# Patient Record
Sex: FEMALE
[De-identification: names, ages, dates, MRNs, and addresses within clinical notes are randomized per-mention and may not be internally consistent; named-entity substitution may affect disease eponyms.]

---

## 2018-07-31 ENCOUNTER — HOSPITAL ENCOUNTER (EMERGENCY)
Dept: HOSPITAL 97 - ER | Age: 33
LOS: 1 days | Discharge: HOME | End: 2018-08-01
Payer: SELF-PAY

## 2018-07-31 DIAGNOSIS — L08.9: Primary | ICD-10-CM

## 2018-07-31 DIAGNOSIS — F17.210: ICD-10-CM

## 2018-07-31 PROCEDURE — 99282 EMERGENCY DEPT VISIT SF MDM: CPT

## 2018-08-01 VITALS — SYSTOLIC BLOOD PRESSURE: 117 MMHG | DIASTOLIC BLOOD PRESSURE: 48 MMHG

## 2018-08-01 VITALS — OXYGEN SATURATION: 98 %

## 2018-08-01 VITALS — TEMPERATURE: 99.7 F

## 2018-08-01 NOTE — EDPHYS
Physician Documentation                                                                           

 [facility]                                                                

Name: Marlen Shelby                                                                              

Age: 32 yrs                                                                                       

Sex: Female                                                                                       

: 1985                                                                                   

MRN: [id number]                                                                                   

Arrival Date: 2018                                                                          

Time: 23:01                                                                                       

Account#: [id number]                                                                            

Bed 24                                                                                            

Private MD:                                                                                       

ED Physician Mariusz Yancey                                                                      

HPI:                                                                                              

                                                                                             

00:01 This 32 yrs old  Female presents to ER via Ambulatory with complaints of skin   jr8 

      infection .                                                                                 

00:01 the patient presents with a swollen area of the abdomen. Description: The affected area jr8 

      is small, well demarcated, draining, erythematous. Onset: The symptoms/episode              

      began/occurred gradually, 2 day(s) ago. Possible cause(s): unknown. Associated signs        

      and symptoms: The patient has no apparent associated signs or symptoms. Severity of         

      symptoms: At their worst the symptoms were mild, in the emergency department the            

      symptoms are unchanged. The patient has not experienced similar symptoms in the past.       

      The patient has not recently seen a physician. Patient stated that she noticed blood        

      blister like formation on lower abdomen the size of a quarter. Today it popped. Blood       

      and yellow exudative material came from wound. Stated that she has increased erythema       

      around the wound now as well .                                                              

                                                                                                  

OB/GYN:                                                                                           

                                                                                             

23:16 LMP 2018                                                                           fc  

                                                                                                  

Historical:                                                                                       

- Allergies:                                                                                      

23:16 No Known Allergies;                                                                     fc  

- Home Meds:                                                                                      

23:16 None [Active];                                                                          fc  

- PMHx:                                                                                           

23:16 Kidney stones;                                                                          fc  

- PSHx:                                                                                           

23:16 kidney stone surg;                                                                      fc  

                                                                                                  

- Immunization history:: Last tetanus immunization: up to date.                                   

- Social history:: Smoking status: Patient uses tobacco products, smokes one pack                 

  cigarettes per day.                                                                             

- Ebola Screening: : Patient negative for fever greater than or equal to 101.5 degrees            

  Fahrenheit, and additional compatible Ebola Virus Disease symptoms Patient denies               

  exposure to infectious person Patient denies travel to an Ebola-affected area in the            

  21 days before illness onset.                                                                   

                                                                                                  

                                                                                                  

ROS:                                                                                              

                                                                                             

00:01 Eyes: Negative for injury, pain, redness, and discharge, ENT: Negative for injury,      jr8 

      pain, and discharge, Neck: Negative for injury, pain, and swelling, Cardiovascular:         

      Negative for chest pain, palpitations, and edema, Respiratory: Negative for shortness       

      of breath, cough, wheezing, and pleuritic chest pain, Abdomen/GI: Negative for              

      abdominal pain, nausea, vomiting, diarrhea, and constipation, Back: Negative for injury     

      and pain, MS/Extremity: Negative for injury and deformity, Neuro: Negative for              

      headache, weakness, numbness, tingling, and seizure.                                        

      Skin: Positive for abscess, erythema, of the abdomen.                                       

                                                                                                  

Exam:                                                                                             

00:01 Eyes:  Pupils equal round and reactive to light, extra-ocular motions intact.  Lids and jr8 

      lashes normal.  Conjunctiva and sclera are non-icteric and not injected.  Cornea within     

      normal limits.  Periorbital areas with no swelling, redness, or edema. ENT:  Nares          

      patent. No nasal discharge, no septal abnormalities noted.  Tympanic membranes are          

      normal and external auditory canals are clear.  Oropharynx with no redness, swelling,       

      or masses, exudates, or evidence of obstruction, uvula midline.  Mucous membranes           

      moist. Neck:  Trachea midline, no thyromegaly or masses palpated, and no cervical           

      lymphadenopathy.  Supple, full range of motion without nuchal rigidity, or vertebral        

      point tenderness.  No Meningismus. Cardiovascular:  Regular rate and rhythm with a          

      normal S1 and S2.  No gallops, murmurs, or rubs.  Normal PMI, no JVD.  No pulse             

      deficits. Respiratory:  Lungs have equal breath sounds bilaterally, clear to                

      auscultation and percussion.  No rales, rhonchi or wheezes noted.  No increased work of     

      breathing, no retractions or nasal flaring. Abdomen/GI:  Soft, non-tender, with normal      

      bowel sounds.  No distension or tympany.  No guarding or rebound.  No evidence of           

      tenderness throughout. Back:  No spinal tenderness.  No costovertebral tenderness.          

      Full range of motion. MS/ Extremity:  Pulses equal, no cyanosis.  Neurovascular intact.     

       Full, normal range of motion. Neuro:  Awake and alert, GCS 15, oriented to person,         

      place, time, and situation.  Cranial nerves II-XII grossly intact.  Motor strength 5/5      

      in all extremities.  Sensory grossly intact.  Cerebellar exam normal.  Normal gait.         

00:01 Skin: Quarter sized area on left lower abdomen that had previous blister formation. Now     

      with serosanguinous discharge from wound. About 3 cm of erythema surrounds the blister.     

      Mild induration felt. No fluctuance noted .                                                 

                                                                                                  

Vital Signs:                                                                                      

                                                                                             

23:16  / 95; Pulse 77; Resp 18; Temp 99.7(O); Pulse Ox 100% on R/A; Weight 142.88 kg      

      (R); Height 5 ft. 8 in. (172.72 cm) (R); Pain 6/10;                                         

23:16 Body Mass Index 47.89 (142.88 kg, 172.72 cm)                                              

                                                                                                  

MDM:                                                                                              

23:31 Patient medically screened.                                                             jr8 

                                                                                             

00:01 Data reviewed: vital signs, nurses notes, and as a result, I will discharge patient.    jr8 

      Data interpreted: Pulse oximetry: on room air is 100 %. Interpretation: normal.             

      Counseling: I had a detailed discussion with the patient and/or guardian regarding: the     

      historical points, exam findings, and any diagnostic results supporting the                 

      discharge/admit diagnosis, the need for outpatient follow up, a family practitioner, to     

      return to the emergency department if symptoms worsen or persist or if there are any        

      questions or concerns that arise at home.                                                   

                                                                                                  

Administered Medications:                                                                         

No medications were administered                                                                  

                                                                                                  

                                                                                                  

Disposition:                                                                                      

07:08 Co-signature as Attending Physician, Mariusz Yancey MD I agree with the assessment and  wa  

      plan of care.                                                                               

                                                                                                  

Disposition:                                                                                      

18 00:06 Discharged to Home. Impression: Local infection of the skin and subcutaneous       

  tissue, unspecified.                                                                            

- Condition is Stable.                                                                            

- Discharge Instructions: Skin Abscess, Cellulitis, Adult.                                        

- Prescriptions for Bactrim - 160 mg Oral Tablet - take 1 tablet by ORAL route              

  every 12 hours for 10 days; 20 tablet.                                                          

- Medication Reconciliation Form, Thank You Letter, Antibiotic Education, Prescription            

  Opioid Use, Work release form form.                                                             

- Follow up: Private Physician; When: 5 - 6 days; Reason: Recheck today's complaints,             

  Continuance of care, Re-evaluation by your physician.                                           

- Problem is new.                                                                                 

- Symptoms have improved.                                                                         

                                                                                                  

                                                                                                  

                                                                                                  

Signatures:                                                                                       

Afia Hernandez RN                   RN                                                      

Krishna Norton PA PA   jr8                                                  

Mariusz Yancey MD MD   wa                                                   

Devon Reynoso RN                    RN   mg2                                                  

                                                                                                  

Corrections: (The following items were deleted from the chart)                                    

00:20 00:06 2018 00:06 Discharged to Home. Impression: Local infection of the skin and  mg2 

      subcutaneous tissue, unspecified. Condition is Stable. Forms are Medication                 

      Reconciliation Form, Thank You Letter, Antibiotic Education, Prescription Opioid Use.       

      Follow up: Private Physician; When: 5 - 6 days; Reason: Recheck today's complaints,         

      Continuance of care, Re-evaluation by your physician. Problem is new. Symptoms have         

      improved. jr8                                                                               

                                                                                                  

**************************************************************************************************

## 2018-08-01 NOTE — ER
Nurse's Notes                                                                                     

 [facility]                                                                

Name: Marlen Shelby                                                                              

Age: 32 yrs                                                                                       

Sex: Female                                                                                       

: 1985                                                                                   

MRN: [id number]                                                                                   

Arrival Date: 2018                                                                          

Time: 23:01                                                                                       

Account#: [id number]                                                                            

Bed 24                                                                                            

Private MD:                                                                                       

Diagnosis: Local infection of the skin and subcutaneous tissue, unspecified                       

                                                                                                  

Presentation:                                                                                     

                                                                                             

23:13 Presenting complaint: Patient states: that she noted a couple of days ago that she what fc  

      she thinks is an insect bit to left lower abd. Today it opened and had yellow drainage.     

      Area around the wound is red, hot and tender. Transition of care: patient was not           

      received from another setting of care. Onset of symptoms was 2018. Risk            

      Assessment: Do you want to hurt yourself or someone else? Patient reports no desire to      

      harm self or others. Initial Sepsis Screen: Does the patient meet any 2 criteria? No.       

      Patient's initial sepsis screen is negative. Does the patient have a suspected source       

      of infection? No. Patient's initial sepsis screen is negative. Care prior to arrival:       

      None.                                                                                       

23:13 Method Of Arrival: Ambulatory                                                             

23:13 Acuity: MATY 4                                                                           fc  

                                                                                                  

OB/GYN:                                                                                           

23:16 LMP 2018                                                                           fc  

                                                                                                  

Historical:                                                                                       

- Allergies:                                                                                      

23:16 No Known Allergies;                                                                     fc  

- Home Meds:                                                                                      

23:16 None [Active];                                                                          fc  

- PMHx:                                                                                           

23:16 Kidney stones;                                                                          fc  

- PSHx:                                                                                           

23:16 kidney stone surg;                                                                      fc  

                                                                                                  

- Immunization history:: Last tetanus immunization: up to date.                                   

- Social history:: Smoking status: Patient uses tobacco products, smokes one pack                 

  cigarettes per day.                                                                             

- Ebola Screening: : Patient negative for fever greater than or equal to 101.5 degrees            

  Fahrenheit, and additional compatible Ebola Virus Disease symptoms Patient denies               

  exposure to infectious person Patient denies travel to an Ebola-affected area in the            

  21 days before illness onset.                                                                   

                                                                                                  

                                                                                                  

Screenin:19 Abuse screen: Denies threats or abuse. Denies injuries from another. Nutritional        mg2 

      screening: No deficits noted. Tuberculosis screening: No symptoms or risk factors           

      identified. Fall Risk None identified.                                                      

                                                                                                  

Assessment:                                                                                       

23:19 General: Appears in no apparent distress. comfortable, Behavior is calm, cooperative.   mg2 

      Pain: Complains of pain in left lower abdomen Pain does not radiate. Pain currently is      

      6 out of 10 on a pain scale. Quality of pain is described as aching, Pain began             

      gradually, 2-3 days ago. Is intermittent. Neuro: Level of Consciousness is awake,           

      alert, obeys commands, Oriented to person, place, time, situation. Cardiovascular:          

      Capillary refill < 3 seconds Patient's skin is warm and dry. Respiratory: Airway is         

      patent Respiratory effort is even, unlabored, Respiratory pattern is regular,               

      symmetrical. GI: No signs and/or symptoms were reported involving the gastrointestinal      

      system. : No signs and/or symptoms were reported regarding the genitourinary system.      

      EENT: No signs and/or symptoms were reported regarding the EENT system. Derm: Skin has      

      lesions on in the left lower abdomen Skin is pink, warm \T\ dry. normal, Abscess located    

      on left lower abdomen is quarter sized. Musculoskeletal: Circulation, motion, and           

      sensation intact.                                                                           

                                                                                                  

Vital Signs:                                                                                      

23:16  / 95; Pulse 77; Resp 18; Temp 99.7(O); Pulse Ox 100% on R/A; Weight 142.88 kg    fc  

      (R); Height 5 ft. 8 in. (172.72 cm) (R); Pain 6/10;                                         

23:16 Body Mass Index 47.89 (142.88 kg, 172.72 cm)                                              

                                                                                                  

ED Course:                                                                                        

23:01 Patient arrived in ED.                                                                  es  

23:15 Triage completed.                                                                         

23:16 Arm band placed on Patient placed in an exam room, on a stretcher.                        

23:19 Devon Reynoso, PARTH is Primary Nurse.                                                  mg2 

23:24 Patient has correct armband on for positive identification. Door closed. Warm blanket   mg2 

      given.                                                                                      

23:31 Krishna Norton PA is PHCP.                                                               [id number] 

23:31 Mariusz Yancey MD is Attending Physician.                                             [id number] 

08                                                                                             

00:16 No provider procedures requiring assistance completed. Patient did not have IV access   mg2 

      during this emergency room visit.                                                           

                                                                                                  

Administered Medications:                                                                         

No medications were administered                                                                  

                                                                                                  

                                                                                                  

Outcome:                                                                                          

00:06 Discharge ordered by MD.                                                                [id number] 

00:16 Discharged to home ambulatory, with family.                                             mg2 

00:16 Condition: good                                                                             

00:16 Discharge instructions given to patient, family, Instructed on discharge instructions,      

      follow up and referral plans. medication usage, Demonstrated understanding of               

      instructions, follow-up care, medications, Prescriptions given X 1.                         

00:20 Patient left the ED.                                                                    mg2 

                                                                                                  

Signatures:                                                                                       

Pilar Moreland Felicia RN                   RN   [facility] Krish[facility]                        PA   [id number]                                                  

Devon Reynoso, RN                    RN   mg2                                                  

                                                                                                  

**************************************************************************************************

## 2018-09-10 ENCOUNTER — HOSPITAL ENCOUNTER (INPATIENT)
Dept: HOSPITAL 97 - ER | Age: 33
LOS: 2 days | Discharge: HOME | DRG: 690 | End: 2018-09-12
Attending: FAMILY MEDICINE | Admitting: HOSPITALIST
Payer: SELF-PAY

## 2018-09-10 VITALS — BODY MASS INDEX: 47.4 KG/M2

## 2018-09-10 DIAGNOSIS — B96.20: ICD-10-CM

## 2018-09-10 DIAGNOSIS — E66.01: ICD-10-CM

## 2018-09-10 DIAGNOSIS — F17.200: ICD-10-CM

## 2018-09-10 DIAGNOSIS — N13.6: Primary | ICD-10-CM

## 2018-09-10 LAB
ALBUMIN SERPL BCP-MCNC: 3.7 G/DL (ref 3.4–5)
ALP SERPL-CCNC: 87 U/L (ref 45–117)
ALT SERPL W P-5'-P-CCNC: 41 U/L (ref 12–78)
AMYLASE SERPL-CCNC: 28 U/L (ref 25–115)
AST SERPL W P-5'-P-CCNC: 20 U/L (ref 15–37)
BUN BLD-MCNC: 10 MG/DL (ref 7–18)
GLUCOSE SERPLBLD-MCNC: 113 MG/DL (ref 74–106)
HCT VFR BLD CALC: 41 % (ref 36–45)
LIPASE SERPL-CCNC: 75 U/L (ref 73–393)
LYMPHOCYTES # SPEC AUTO: 1.2 K/UL (ref 0.7–4.9)
MCH RBC QN AUTO: 30.4 PG (ref 27–35)
MCV RBC: 90.3 FL (ref 80–100)
PMV BLD: 8.7 FL (ref 7.6–11.3)
POTASSIUM SERPL-SCNC: 4 MMOL/L (ref 3.5–5.1)
RBC # BLD: 4.54 M/UL (ref 3.86–4.86)
UA COMPLETE W REFLEX CULTURE PNL UR: (no result)

## 2018-09-10 PROCEDURE — 80076 HEPATIC FUNCTION PANEL: CPT

## 2018-09-10 PROCEDURE — 83690 ASSAY OF LIPASE: CPT

## 2018-09-10 PROCEDURE — 87077 CULTURE AEROBIC IDENTIFY: CPT

## 2018-09-10 PROCEDURE — 80053 COMPREHEN METABOLIC PANEL: CPT

## 2018-09-10 PROCEDURE — 74450 X-RAY URETHRA/BLADDER: CPT

## 2018-09-10 PROCEDURE — 85025 COMPLETE CBC W/AUTO DIFF WBC: CPT

## 2018-09-10 PROCEDURE — 74176 CT ABD & PELVIS W/O CONTRAST: CPT

## 2018-09-10 PROCEDURE — 96365 THER/PROPH/DIAG IV INF INIT: CPT

## 2018-09-10 PROCEDURE — 36415 COLL VENOUS BLD VENIPUNCTURE: CPT

## 2018-09-10 PROCEDURE — 87186 SC STD MICRODIL/AGAR DIL: CPT

## 2018-09-10 PROCEDURE — 99285 EMERGENCY DEPT VISIT HI MDM: CPT

## 2018-09-10 PROCEDURE — 81025 URINE PREGNANCY TEST: CPT

## 2018-09-10 PROCEDURE — 81003 URINALYSIS AUTO W/O SCOPE: CPT

## 2018-09-10 PROCEDURE — 80048 BASIC METABOLIC PNL TOTAL CA: CPT

## 2018-09-10 PROCEDURE — 96361 HYDRATE IV INFUSION ADD-ON: CPT

## 2018-09-10 PROCEDURE — 96375 TX/PRO/DX INJ NEW DRUG ADDON: CPT

## 2018-09-10 PROCEDURE — 51610 INJECTION FOR BLADDER X-RAY: CPT

## 2018-09-10 PROCEDURE — 87086 URINE CULTURE/COLONY COUNT: CPT

## 2018-09-10 PROCEDURE — 87088 URINE BACTERIA CULTURE: CPT

## 2018-09-10 PROCEDURE — 82150 ASSAY OF AMYLASE: CPT

## 2018-09-10 PROCEDURE — 81015 MICROSCOPIC EXAM OF URINE: CPT

## 2018-09-10 RX ADMIN — Medication SCH: at 21:00

## 2018-09-10 RX ADMIN — SODIUM CHLORIDE SCH MLS: 0.9 INJECTION, SOLUTION INTRAVENOUS at 23:11

## 2018-09-10 RX ADMIN — Medication SCH ML: at 23:12

## 2018-09-10 NOTE — EDPHYS
Physician Documentation                                                                           

 [facility]                                                                

Name: Marlen Shelby                                                                              

Age: 32 yrs                                                                                       

Sex: Female                                                                                       

: 1985                                                                                   

MRN: [id number]                                                                                   

Arrival Date: 09/10/2018                                                                          

Time: 19:08                                                                                       

Account#: [id number]                                                                            

Bed 15                                                                                            

Private MD:                                                                                       

ED Physician Marjan Sargent                                                                    

HPI:                                                                                              

09/10                                                                                             

19:42 This 32 yrs old  Female presents to ER via Wheelchair with complaints of Flank  ma2 

      Pain, Back Pain.                                                                            

19:42 The patient complains of pain in the left low back. The pain does not radiate. Onset:   ma2 

      The symptoms/episode began/occurred gradually, 1 day(s) ago. Associated signs and           

      symptoms: Pertinent positives: dysuria, fever, nausea, vomiting, Pertinent negatives:       

      diarrhea, dizziness. Severity of pain: in the emergency department the pain is actually     

      worse. The patient has experienced a previous episode.                                      

                                                                                                  

OB/GYN:                                                                                           

19:35 LMP 2018                                                                           ea  

                                                                                                  

Historical:                                                                                       

- Allergies:                                                                                      

19:33 No Known Allergies;                                                                     ea  

- PMHx:                                                                                           

19:33 Kidney stones;                                                                          ea  

- PSHx:                                                                                           

19:33 kidney stone surg;                                                                      ea  

                                                                                                  

- Immunization history:: Adult Immunizations up to date.                                          

- Social history:: Smoking status: Patient/guardian denies using tobacco,                         

  Patient/guardian denies using alcohol, street drugs, The patient lives with family.             

- Ebola Screening: : No symptoms or risks identified at this time.                                

- Family history:: not pertinent.                                                                 

                                                                                                  

                                                                                                  

ROS:                                                                                              

19:42 Constitutional: Negative for fever, chills, and weight loss, ENT: Negative for injury,  ma2 

      pain, and discharge, Cardiovascular: Negative for chest pain, palpitations, and edema.      

19:42 : Positive for urinary symptoms, flank pain, Negative for burning with urination,         

      difficulty urinating, missed period.                                                        

19:42 All other systems are negative.                                                             

                                                                                                  

Exam:                                                                                             

19:42 Neck:  Trachea midline, no thyromegaly or masses palpated, and no cervical              ma2 

      lymphadenopathy.  Supple, full range of motion without nuchal rigidity, or vertebral        

      point tenderness.  No Meningismus. Chest/axilla:  Normal chest wall appearance and          

      motion.  Nontender with no deformity.  No lesions are appreciated. Cardiovascular:          

      Regular rate and rhythm with a normal S1 and S2.  No gallops, murmurs, or rubs.  Normal     

      PMI, no JVD.  No pulse deficits. Respiratory:  Lungs have equal breath sounds               

      bilaterally, clear to auscultation and percussion.  No rales, rhonchi or wheezes noted.     

       No increased work of breathing, no retractions or nasal flaring. MS/ Extremity:            

      Pulses equal, no cyanosis.  Neurovascular intact.  Full, normal range of motion. Neuro:     

       Awake and alert, GCS 15, oriented to person, place, time, and situation.  Cranial          

      nerves II-XII grossly intact.  Motor strength 5/5 in all extremities.  Sensory grossly      

      intact.  Cerebellar exam normal.  Normal gait.                                              

19:42 Abdomen/GI:  Soft, non-tender, with normal bowel sounds.  No distension or tympany.  No     

      guarding or rebound.  No evidence of tenderness throughout.                                 

19:42 Constitutional: The patient appears in no acute distress.                                   

19:42 : CVA tenderness, on the left.                                                            

                                                                                                  

Vital Signs:                                                                                      

19:35  / 92; Pulse 85; Resp 19; Temp 98.5(O); Pulse Ox 100% on R/A; Weight 141.52 kg;   ea  

      Height 5 ft. 8 in. (172.72 cm); Pain 8/10;                                                  

20:01  / 82; Pulse 84; Resp 18; Pulse Ox 100% ; Pain 5/10;                              ea  

21:00  / 82; Pulse 75; Resp 18; Pulse Ox 100% ;                                         ea  

22:15  / 89; Pulse 75; Resp 18; Temp 98; Pulse Ox 100% on R/A; Pain 6/10;               ea  

19:35 Body Mass Index 47.44 (141.52 kg, 172.72 cm)                                            ea  

                                                                                                  

MDM:                                                                                              

19:24 Patient medically screened.                                                             ma2 

19:42 Differential diagnosis: nephrolithiasis, pyelonephritis, UTI, pancreatitis.             ma2 

20:30 Data reviewed: vital signs, nurses notes, lab test result(s), radiologic studies.       ma2 

      Counseling: I had a detailed discussion with the patient and/or guardian regarding: the     

      historical points, exam findings, and any diagnostic results supporting the                 

      discharge/admit diagnosis, the presence of at least one elevated blood pressure reading     

      (>120/80) during this emergency department visit, the need for further work-up and          

      treatment in the hospital. Response to treatment: the patient's symptoms have mildly        

      improved after treatment. ED course: case discussed with Dr. Bhatia urologist, he            

      recommend admission npo and IV abx, discussed and accepted by Dr. cadena .                    

                                                                                                  

09/10                                                                                             

19:32 Order name: Urine Dipstick--Ancillary (enter results)                                   [facility] 

09/10                                                                                             

19:32 Order name: Urine Culture                                                               [facility] 

09/10                                                                                             

19:32 Order name: Urine Microscopic Only; Complete Time: 20:24                                [facility] 

09/10                                                                                             

19:32 Order name: Urine Pregnancy--Ancillary (enter results)                                  [facility] 

09/10                                                                                             

19:40 Order name: Amylase, Serum; Complete Time: 20:24                                        [facility] 

09/10                                                                                             

19:40 Order name: Basic Metabolic Panel; Complete Time: 20:24                                 [facility] 

09/10                                                                                             

20:30 Interpretation: Within normal limits.                                                   [facility] 

09/10                                                                                             

19:40 Order name: CBC with Diff; Complete Time: 20:24                                         [facility] 

09/10                                                                                             

19:40 Order name: Creatinine for Radiology; Complete Time: 20:24                              [facility] 

09/10                                                                                             

19:40 Order name: Hepatic Function; Complete Time: 20:24                                      [facility] 

09/10                                                                                             

19:40 Order name: Lipase; Complete Time: 20:24                                                [facility] 

09/10                                                                                             

20:45 Order name: CBC with Automated Diff                                                     [facility]

09/10                                                                                             

20:45 Order name: CBC with Automated Diff                                                     [facility]

09/10                                                                                             

20:45 Order name: Comprehensive Metabolic Panel                                               [facility]

09/10                                                                                             

20:45 Order name: Comprehensive Metabolic Panel                                               [facility]

09/10                                                                                             

19:40 Order name: Urine Pregnancy Test (obtain specimen); Complete Time: 19:42                [facility] 

09/10                                                                                             

19:40 Order name: IV Saline Lock; Complete Time: 19:42                                        [facility] 

09/10                                                                                             

19:40 Order name: Labs collected and sent; Complete Time: 19:42                               [facility] 

09/10                                                                                             

19:40 Order name: CT Abd/Pelvis - Without Cont; Complete Time: 20:24                          [facility] 

09/10                                                                                             

20:40 Order name: CONS Pharmacy Consult                                                       [facility]

09/10                                                                                             

20:40 Order name: NPO                                                                         [facility]

09/10                                                                                             

20:45 Order name: CONS Pharmacy Consult                                                       [facility]

09/10                                                                                             

20:45 Order name: CONS Physician Consult                                                      [facility]

09/10                                                                                             

19:40 Order name: Urine Dipstick-Ancillary (obtain specimen); Complete Time: 19:42            ma2 

                                                                                                  

Administered Medications:                                                                         

20:03 Drug: NS 0.9% 1000 ml Route: IV; Rate: 1 bolus; Site: left antecubital;                 ea  

21:30 Follow up: Response: No adverse reaction; IV Status: Completed infusion; IV Intake:     ea  

      1000ml                                                                                      

20:03 Drug: morphine 4 mg Route: IVP; Site: left antecubital;                                 ea  

20:24 Follow up: Response: No adverse reaction; Pain is decreased                             ea  

20:04 Drug: Zofran 4 mg Route: IVP; Site: left antecubital;                                   ea  

20:25 Follow up: Response: No adverse reaction; Marked relief of symptoms                     ea  

20:04 Drug: Rocephin 1 grams Route: IV; Rate: calculated rate; Site: left antecubital;        ea  

20:25 Follow up: Response: No adverse reaction; IV Status: Completed infusion                 ea  

20:19 Drug: NS 0.9% 1000 ml Route: IV; Rate: 1 bolus; Site: left antecubital;                 ea  

22:00 Follow up: Response: No adverse reaction; IV Status: Completed infusion; IV Intake:     ea  

      1000ml                                                                                      

21:00 Drug: morphine 4 mg Route: IVP; Site: left antecubital;                                 ea  

21:30 Follow up: Response: No adverse reaction; Marked relief of symptoms; Pain is decreased  ea  

21:17 Drug: Zofran 4 mg Route: IVP; Site: left antecubital;                                   ea  

21:30 Follow up: Response: No adverse reaction; Marked relief of symptoms                     ea  

22:07 Drug: morphine 4 mg Route: IVP; Site: left antecubital;                                 ea  

22:20 Follow up: Response: No adverse reaction; Pain is decreased                             ea  

                                                                                                  

                                                                                                  

Disposition:                                                                                      

09/10/18 20:34 Hospitalization ordered by Marjan Cadena for Inpatient Admission. Preliminary     

  diagnosis are Calculus of lower urinary tract, Urinary tract infection, site                    

  not specified.                                                                                  

- Bed requested for Telemetry/MedSurg (Inpatient).                                                

- Status is Inpatient Admission.                                                              ea  

- Condition is Fair.                                                                              

- Problem is new.                                                                                 

- Symptoms are unchanged.                                                                         

UTI on Admission? Yes                                                                             

                                                                                                  

                                                                                                  

                                                                                                  

Signatures:                                                                                       

Dispatcher [employer]                           EDMS                                                 

Carmen Astudillo                               rg2                                                  

Maia Boyd RN RN ea Alzahri, Mohammad, MD MD ma2                                                  

                                                                                                  

Corrections: (The following items were deleted from the chart)                                    

20:30 20:30 Abnormal. ma2                                                                     ma2 

21:59 20:34 Hospitalization Ordered by Marjan Cadena MD for Inpatient Admission. Preliminary  rg2 

      diagnosis is Calculus of lower urinary tract; Urinary tract infection, site not             

      specified. Bed requested for Telemetry/MedSurg (Inpatient). Status is Inpatient             

      Admission. Condition is Fair. Problem is new. Symptoms are unchanged. UTI on Admission?     

      Yes. ma2                                                                                    

22:30 21:59 09/10/2018 20:34 Hospitalization Ordered by Marjan Cadena MD for Inpatient        ea  

      Admission. Preliminary diagnosis is Calculus of lower urinary tract; Urinary tract          

      infection, site not specified. Bed requested for Telemetry/MedSurg (Inpatient). Status      

      is Inpatient Admission. Condition is Fair. Problem is new. Symptoms are unchanged. UTI      

      on Admission? Yes. rg2                                                                      

                                                                                                  

**************************************************************************************************

## 2018-09-10 NOTE — ER
Nurse's Notes                                                                                     

 [facility]                                                                

Name: Marlen Shelby                                                                              

Age: 32 yrs                                                                                       

Sex: Female                                                                                       

: 1985                                                                                   

MRN: [id number]                                                                                   

Arrival Date: 09/10/2018                                                                          

Time: 19:08                                                                                       

Account#: [id number]                                                                            

Bed 15                                                                                            

Private MD:                                                                                       

Diagnosis: Calculus of lower urinary tract;Urinary tract infection, site not specified            

                                                                                                  

Presentation:                                                                                     

09/10                                                                                             

19:31 Presenting complaint: Patient states: Patient states she started to have left flank     ea  

      pain that started yesterday and started to get worse this evening. Pt reports history       

      of kidney stone about 18 months ago, and complains of urinary frequency. Transition of      

      care: patient was not received from another setting of care. Onset of symptoms was          

      September 10, 2018. Risk Assessment: Do you want to hurt yourself or someone else?          

      Patient reports no desire to harm self or others. Initial Sepsis Screen: Does the           

      patient meet any 2 criteria? No. Patient's initial sepsis screen is negative. Does the      

      patient have a suspected source of infection? Yes: Dysuria/Frequency/Urgency/UTI. Care      

      prior to arrival: None.                                                                     

19:31 Method Of Arrival: Wheelchair                                                           ea  

19:31 Acuity: MATY 3                                                                           ea  

                                                                                                  

Triage Assessment:                                                                                

19:34 General: Appears uncomfortable, Behavior is calm, cooperative. Pain: Complains of pain  ea  

      in left low back, left flank Pain currently is 8 out of 10 on a pain scale. Quality of      

      pain is described as aching, Pain began 1 day ago. Is intermittent. EENT: No signs          

      and/or symptoms were reported regarding the EENT system. Neuro: Level of Consciousness      

      is awake, alert, obeys commands, Oriented to person, place, time, situation.                

      Cardiovascular: Patient's skin is warm and dry. Respiratory: Airway is patent               

      Respiratory effort is even, unlabored, Respiratory pattern is regular, symmetrical. GI:     

      Abdomen is non-distended, Bowel sounds present X 4 quads. : Reports urinary               

      frequency. Derm: Skin is pink, warm \T\ dry. Musculoskeletal: Circulation, motion, and      

      sensation intact.                                                                           

                                                                                                  

OB/GYN:                                                                                           

19:35 LMP 2018                                                                           ea  

                                                                                                  

Historical:                                                                                       

- Allergies:                                                                                      

19:33 No Known Allergies;                                                                     ea  

- PMHx:                                                                                           

19:33 Kidney stones;                                                                          ea  

- PSHx:                                                                                           

19:33 kidney stone surg;                                                                      ea  

                                                                                                  

- Immunization history:: Adult Immunizations up to date.                                          

- Social history:: Smoking status: Patient/guardian denies using tobacco,                         

  Patient/guardian denies using alcohol, street drugs, The patient lives with family.             

- Ebola Screening: : No symptoms or risks identified at this time.                                

- Family history:: not pertinent.                                                                 

                                                                                                  

                                                                                                  

Screenin:36 Abuse screen: Denies threats or abuse. Nutritional screening: No deficits noted.        ea  

      Tuberculosis screening: No symptoms or risk factors identified. Fall Risk None              

      identified.                                                                                 

                                                                                                  

Assessment:                                                                                       

19:31 Reassessment: see triage assessment. Neuro: Level of Consciousness is awake, alert,     ea  

      obeys commands, Oriented to person, place, time, situation.                                 

20:24 Reassessment: Patient and/or family updated on plan of care and expected duration. Pain ea  

      level reassessed. Patient is alert, oriented x 3, equal unlabored respirations, skin        

      warm/dry/pink. Patient states feeling better. Patient states symptoms have improved.        

21:00 Reassessment: Pt complaining of pain, provider notified, med order obtained, medication ea  

      administered, pt tolerated well.                                                            

22:16 Reassessment: Report given to Liset ALBA on fourth floor.                                ea  

                                                                                                  

Vital Signs:                                                                                      

19:35  / 92; Pulse 85; Resp 19; Temp 98.5(O); Pulse Ox 100% on R/A; Weight 141.52 kg;   ea  

      Height 5 ft. 8 in. (172.72 cm); Pain 8/10;                                                  

20:01  / 82; Pulse 84; Resp 18; Pulse Ox 100% ; Pain 5/10;                              ea  

21:00  / 82; Pulse 75; Resp 18; Pulse Ox 100% ;                                         ea  

22:15  / 89; Pulse 75; Resp 18; Temp 98; Pulse Ox 100% on R/A; Pain 6/10;               ea  

19:35 Body Mass Index 47.44 (141.52 kg, 172.72 cm)                                            ea  

                                                                                                  

ED Course:                                                                                        

19:08 Patient arrived in ED.                                                                  rg4 

19:23 Marjan Sargent MD is Attending Physician.                                           ma2 

19:30 Arm band placed on right wrist. Patient placed in an exam room, on a stretcher, on      ea  

      pulse oximetry.                                                                             

19:30 Urine collected: clean catch specimen, cloudy.                                          cc  

19:31 Maia Boyd, PARTH is Primary Nurse.                                                    ea  

19:33 Triage completed.                                                                       ea  

19:36 Patient has correct armband on for positive identification. Bed in low position. Call   ea  

      light in reach. Side rails up X 1.                                                          

19:40 Initial lab(s) drawn, by me, sent to lab. Inserted saline lock: 20 gauge in left        cc  

      antecubital area, using aseptic technique. Blood collected.                                 

19:44 Patient moved to CT.                                                                    j6 

19:50 CT completed. Patient tolerated procedure well. Patient moved back from CT.             6 

19:51 CT Abd/Pelvis - Without Cont In Process Unspecified.                                    EDMS

20:32 Marjan Woody MD is Hospitalizing Provider.                                           ma 

22:16 No provider procedures requiring assistance completed. Patient admitted, IV remains in  ea  

      place.                                                                                      

                                                                                                  

Administered Medications:                                                                         

20:03 Drug: NS 0.9% 1000 ml Route: IV; Rate: 1 bolus; Site: left antecubital;                 ea  

21:30 Follow up: Response: No adverse reaction; IV Status: Completed infusion; IV Intake:     ea  

      1000ml                                                                                      

20:03 Drug: morphine 4 mg Route: IVP; Site: left antecubital;                                 ea  

20:24 Follow up: Response: No adverse reaction; Pain is decreased                             ea  

20:04 Drug: Zofran 4 mg Route: IVP; Site: left antecubital;                                   ea  

20:25 Follow up: Response: No adverse reaction; Marked relief of symptoms                     ea  

20:04 Drug: Rocephin 1 grams Route: IV; Rate: calculated rate; Site: left antecubital;        ea  

20:25 Follow up: Response: No adverse reaction; IV Status: Completed infusion                 ea  

20:19 Drug: NS 0.9% 1000 ml Route: IV; Rate: 1 bolus; Site: left antecubital;                 ea  

22:00 Follow up: Response: No adverse reaction; IV Status: Completed infusion; IV Intake:     ea  

      1000ml                                                                                      

21:00 Drug: morphine 4 mg Route: IVP; Site: left antecubital;                                 ea  

21:30 Follow up: Response: No adverse reaction; Marked relief of symptoms; Pain is decreased  ea  

21:17 Drug: Zofran 4 mg Route: IVP; Site: left antecubital;                                   ea  

21:30 Follow up: Response: No adverse reaction; Marked relief of symptoms                     ea  

22:07 Drug: morphine 4 mg Route: IVP; Site: left antecubital;                                 ea  

22:20 Follow up: Response: No adverse reaction; Pain is decreased                             ea  

                                                                                                  

                                                                                                  

Intake:                                                                                           

21:30 IV: 1000ml; Total: 1000ml.                                                              ea  

22:00 IV: 1000ml; Total: 2000ml.                                                              ea  

                                                                                                  

Outcome:                                                                                          

20:34 Decision to Hospitalize by Provider.                                                    pankaj 

22:17 Admitted to Med/surg accompanied by tech, room 421, with chart, Report called to        mode Hutchins RN                                                                                   

22:26 Condition: stable                                                                       ea  

22:30 Patient left the ED.                                                                    mode  

                                                                                                  

Signatures:                                                                                       

Dispatcher [employer]                           EDMS                                                 

Erin Mccoy Rubi rg4                                                  

Maia Boyd RN                      RN   Marjan Randle MD MD ma2                                                  

Margret Whiteg6                                                  

                                                                                                  

**************************************************************************************************

## 2018-09-10 NOTE — RAD REPORT
EXAM DESCRIPTION:  CT - Abdomen   Pelvis Wo Contrast - 9/10/2018 7:51 pm

 

CLINICAL HISTORY:  Abdominal pain.

renal colic

 

COMPARISON:  Abdomen   Pelvis W Contrast dated 3/2/2017

 

TECHNIQUE:  CT imaging of the abdomen and pelvis was performed without contrast. Solid organ, bowel a
nd vascular assessment is limited due to lack of IV and oral contrast.

 

All CT scans are performed using dose optimization technique as appropriate and may include automated
 exposure control or mA/KV adjustment according to patient size.

 

FINDINGS:  Small pulmonary nodule in the right lower lobe measuring 8 mm is noted, unchanged.

 

The liver, spleen, pancreas, adrenal glands and right kidney are within normal limits for a limited n
on-contrast examination.Mild left hydronephrosis and hydroureter is present caused by a suspected 1-2
 mm calculus in the distal left ureter.

 

No bowel obstruction, free air, free fluid or abscess.  The appendix is normal.

 

The osseous structures are within normal limits.

 

IMPRESSION:  Suspected 1-2 mm calculus in the distal left ureter resulting in mild left hydronephrosi
s.

 

A limited non-contrast examination was performed as detailed.

## 2018-09-11 LAB
ALBUMIN SERPL BCP-MCNC: 3.1 G/DL (ref 3.4–5)
ALP SERPL-CCNC: 71 U/L (ref 45–117)
ALT SERPL W P-5'-P-CCNC: 31 U/L (ref 12–78)
AST SERPL W P-5'-P-CCNC: 15 U/L (ref 15–37)
BUN BLD-MCNC: 8 MG/DL (ref 7–18)
GLUCOSE SERPLBLD-MCNC: 105 MG/DL (ref 74–106)
HCT VFR BLD CALC: 37.5 % (ref 36–45)
LYMPHOCYTES # SPEC AUTO: 2.5 K/UL (ref 0.7–4.9)
MCH RBC QN AUTO: 30.5 PG (ref 27–35)
MCV RBC: 90.8 FL (ref 80–100)
PMV BLD: 8.9 FL (ref 7.6–11.3)
POTASSIUM SERPL-SCNC: 4.2 MMOL/L (ref 3.5–5.1)
RBC # BLD: 4.13 M/UL (ref 3.86–4.86)

## 2018-09-11 PROCEDURE — BT1FZZZ FLUOROSCOPY OF LEFT KIDNEY, URETER AND BLADDER: ICD-10-PCS

## 2018-09-11 PROCEDURE — 0T778DZ DILATION OF LEFT URETER WITH INTRALUMINAL DEVICE, VIA NATURAL OR ARTIFICIAL OPENING ENDOSCOPIC: ICD-10-PCS

## 2018-09-11 RX ADMIN — MORPHINE SULFATE PRN MG: 4 INJECTION, SOLUTION INTRAMUSCULAR; INTRAVENOUS at 08:30

## 2018-09-11 RX ADMIN — MORPHINE SULFATE PRN MG: 4 INJECTION, SOLUTION INTRAMUSCULAR; INTRAVENOUS at 05:16

## 2018-09-11 RX ADMIN — Medication SCH: at 20:35

## 2018-09-11 RX ADMIN — Medication SCH: at 08:21

## 2018-09-11 RX ADMIN — SODIUM CHLORIDE SCH MLS: 0.9 INJECTION, SOLUTION INTRAVENOUS at 20:35

## 2018-09-11 RX ADMIN — METOPROLOL TARTRATE SCH MG: 50 TABLET, FILM COATED ORAL at 17:59

## 2018-09-11 RX ADMIN — MEPERIDINE HYDROCHLORIDE ONE MG: 50 INJECTION, SOLUTION INTRAMUSCULAR; INTRAVENOUS; SUBCUTANEOUS at 14:20

## 2018-09-11 RX ADMIN — MEPERIDINE HYDROCHLORIDE ONE MG: 50 INJECTION, SOLUTION INTRAMUSCULAR; INTRAVENOUS; SUBCUTANEOUS at 14:26

## 2018-09-11 RX ADMIN — CEFTRIAXONE SCH MLS: 1 INJECTION, SOLUTION INTRAVENOUS at 08:20

## 2018-09-11 RX ADMIN — SODIUM CHLORIDE SCH MLS: 0.9 INJECTION, SOLUTION INTRAVENOUS at 18:00

## 2018-09-11 RX ADMIN — SODIUM CHLORIDE SCH MLS: 0.9 INJECTION, SOLUTION INTRAVENOUS at 08:20

## 2018-09-11 RX ADMIN — MORPHINE SULFATE PRN MG: 4 INJECTION, SOLUTION INTRAMUSCULAR; INTRAVENOUS at 12:02

## 2018-09-11 RX ADMIN — MORPHINE SULFATE PRN MG: 4 INJECTION, SOLUTION INTRAMUSCULAR; INTRAVENOUS at 17:01

## 2018-09-11 RX ADMIN — METOPROLOL TARTRATE SCH MG: 50 TABLET, FILM COATED ORAL at 05:16

## 2018-09-11 RX ADMIN — MORPHINE SULFATE PRN MG: 4 INJECTION, SOLUTION INTRAMUSCULAR; INTRAVENOUS at 23:22

## 2018-09-11 NOTE — PN
Date of Progress Note:  09/11/2018



Subjective:  The patient is seen and examined.  Chart reviewed and case discussed with RN.  The patie
nt still complains of left flank pain.  It is some nausea.  No vomiting.



Review of Systems:

Negative except as above.



Medications:  List reviewed.



Physical Examination:

Vital Signs:  Temperature 97.4, heart rate 69, blood pressure 139/76, respirations 18, O2 100% on elizabeth
m air. 

General:  Awake, alert, oriented x3.  Some distress.  Morbidly obese female, ill-appearing. 

CV:  S1 and S2.  No murmurs.  Regular rate and rhythm.  Peripheral pulses present. 

Respiratory:  Moving air well bilaterally.  No wheezing or stridor.  No use of accessory muscles. 

Gastrointestinal:  Abdomen is soft, nontender, nondistended.  Positive bowel sounds.  The patient has
 left flank pain. 

Extremities:  No clubbing, cyanosis, or edema. 

Neurologic:  Nonfocal.



Laboratory Data:  Sodium 143, potassium 4.2, chloride 110, CO2 28, BUN 8, creatinine 0.8, glucose 105
, calcium 9.3.  Albumin 3.1.  WBC 11.2, H and H 12.6, 37.5, platelets 294, neutrophils 67%.  Urine cu
ltures growing 100,000 colony-forming units of 3+ gram-negative rods.



Assessment And Plan:  A 32-year-old female with:

1.Nephrolithiasis with left-sided hydronephrosis.  We will continue with IV antibiotics and IV fluid
s.  Dr. Bhatia with Neurology has been consulted.

2.Urinary tract infection with possible infected kidney stone.  Continue with IV antibiotics.  Urine
 culture growing out gram-negative rods.  ID and sensitivity pending.

3.Morbid obesity with BMI 47.4.  The patient will likely need 

cystoscopy and possible stent placement.

4.Continue current treatment.





SA/MODL

DD:  09/11/2018 13:31:00Voice ID:  [id number]

DT:  09/11/2018 17:45:33Report ID:  [id number]

## 2018-09-11 NOTE — CON
History Of Present Illness:  A 32-year-old female with a history of passed stones 18 months ago, was 
seen at [facility] where she had a cysto, ureteroscopy, laser lithotripsy, stone removal, on the left side,
 came out with left-sided pain tonight of 2 days' duration.  She was also diagnosed with a UTI.  She 
was admitted overnight for antibiotic and n.p.o. possible for intervention.



Allergies:  NO KNOWN DRUG ALLERGIES.



Home Medications:  None.



Past Medical History:  No diabetes, history of kidney stones, tonsillectomy in the past.



Family History:  Negative.  Noncontributory.



Social History:  Current everyday smoker.  No caffeine use.  Resides at home.



Review of Systems:

A 10-point review of systems otherwise unremarkable.



Physical Examination:

General:  She was afebrile, stable. 

HEENT:  Atraumatic, normocephalic. 

Neck:  Supple. 

Respiratory:  Clear. 

Cardiovascular:  S1-S2. 

Gastrointestinal:  Soft, nontender. 

Musculoskeletal:  No tenderness. 

Skin:  No rashes. 

Neurologic:  Alert and oriented.  Strength 5/5.  Grossly intact. 

Lymphatics:  No adenopathy.



Laboratory Data:  Reviewed.  Creatinine 0.8.  White count 12.7, H and H 13 and 41, platelets 332.  So
dium 138, potassium 4.0, BUN 10, creatinine 0.8, glucose 113, total bilirubin 0.4, AST 20, ALT 41, al
kaline phosphatase 87, amylase 28, lipase 75.



Assessment:  Small 1-2 mm stone from the left ureterovesical junction, urinary tract infection, morbi
d obesity.



Plan:  For cysto stent placement, let the stent stay for about a week, 7-10 days, hopefully, will red
uce the inflammation that she has and hopefully she can the stone once the stent is pulled.  She unde
rstands all the general information, alternatives, and risks and wishes to proceed.  I decided not to
 do ureteroscopy at this point due to the fact that she has a UTI.





SUMEET/JAY

DD:  09/11/2018 10:23:12Voice ID:  [id number]

DT:  09/11/2018 15:05:47Report ID:  [id number]

## 2018-09-11 NOTE — P.HP
Certification for Inpatient


Patient admitted to: Inpatient


With expected LOS: >2 Midnights


Patient will require the following post-hospital care: None


Practitioner: I am a practitioner with admitting privileges, knowledge of 

patient current condition, hospital course, and medical plan of care.


Services: Services provided to patient in accordance with Admission 

requirements found in Title 42 Section 412.3 of the Code of Federal Regulations





Patient History


Date of Service: 09/10/18


Reason for admission: UTI/nephrolithiasis/hydronephrosis


History of Present Illness: 





Patient is a 32-year-old female with history of nephrolithiasis.  She presents 

to the hospital with complaints of left-sided flank pain. Her workup revealed a 

small stone in the ureters with left-sided hydronephrosis.  Patient will be 

admitted to the hospital and will consult Urology.  Patient may benefit from 

cystoscopy with stent placement.  Patient will also need antibiotic for UTI.  

NPO after midnight for possible intervention.


Allergies





No Known Allergies Allergy (Verified 09/10/18 22:51)


 





Home Medications: 








NK [No Home Meds]  09/10/18 








- Past Medical/Surgical History


Has patient received pneumonia vaccine in the past: No


Diabetic: No


-: kidney stones


-: kidney stones removal


-: tonsillectomy





- Family History


  ** Father


History Unknown: Yes





  ** Mother


History Unknown: Yes





- Social History


Smoking Status: Current every day smoker


Alcohol use: Yes


CD- Drugs: No


Caffeine use: Yes


Place of Residence: Home





Review of Systems


10-point ROS is otherwise unremarkable





Physical Examination





- Vital Signs


Temperature: 97.4 F


Blood Pressure: 139/76


Pulse: 69


Respirations: 18


Pulse Ox (%): 100





- Physical Exam


General: Alert, In no apparent distress, Oriented x3


HEENT: Atraumatic, PERRLA, Mucous membr. moist/pink, EOMI, Sclerae nonicteric


Neck: Supple, 2+ carotid pulse no bruit, No LAD, Without JVD or thyroid 

abnormality


Respiratory: Clear to auscultation bilaterally, Normal air movement


Cardiovascular: Regular rate/rhythm, Normal S1 S2, No murmurs


Gastrointestinal: Normal bowel sounds, Soft and benign, Non-distended, 

Tenderness (Flank tenderness)


Musculoskeletal: No clubbing, No swelling, No tenderness


Integumentary: No rashes


Neurological: Normal gait, Normal speech, Normal strength at 5/5 x4 extr, 

Normal tone, Sensation intact, Cranial nerves 3-12 intact, Normal affect


Lymphatics: No axilla or inguinal lymphadenopathy





- Studies


Laboratory Data (last 24 hrs)





09/10/18 19:40: Creatinine 0.80


09/10/18 19:40: WBC 12.7 H, Hgb 13.8, Hct 41.0, Plt Count 332


09/10/18 19:40: Sodium 138, Potassium 4.0, BUN 10, Creatinine 0.80, Glucose 113 

H, Total Bilirubin 0.4, AST 20, ALT 41, Alkaline Phosphatase 87, Amylase 28, 

Lipase 75








Assessment & Plan





- Plan





Assessment:


1. Nephrolithiasis with left-sided hydronephrosis


2. Urinary tract infection with possible infected kidney stone


3. Morbid obesity





Plan:


1. IV hydration


2. IV antibiotics


3. Pain control


4. Urology Consult


5. NPO after midnight for possible cystoscopy


6. GI and DVT prophylaxis


Discharge Plan: Home


Plan to discharge in: 48 Hours





- Advance Directives


Does patient have a Living Will: No


Does patient have a Durable POA for Healthcare: No





- Code Status/Comfort Care


Code Status Assessed: Yes


Code Status: Full Code


Critical Care: No


Time Spent Managing PTS Care (In Minutes): 50

## 2018-09-12 VITALS — DIASTOLIC BLOOD PRESSURE: 85 MMHG | SYSTOLIC BLOOD PRESSURE: 138 MMHG | TEMPERATURE: 97.4 F

## 2018-09-12 VITALS — OXYGEN SATURATION: 98 %

## 2018-09-12 LAB
ALBUMIN SERPL BCP-MCNC: 2.8 G/DL (ref 3.4–5)
ALP SERPL-CCNC: 67 U/L (ref 45–117)
ALT SERPL W P-5'-P-CCNC: 34 U/L (ref 12–78)
AST SERPL W P-5'-P-CCNC: 19 U/L (ref 15–37)
BUN BLD-MCNC: 10 MG/DL (ref 7–18)
GLUCOSE SERPLBLD-MCNC: 106 MG/DL (ref 74–106)
HCT VFR BLD CALC: 35.6 % (ref 36–45)
LYMPHOCYTES # SPEC AUTO: 2.6 K/UL (ref 0.7–4.9)
MCH RBC QN AUTO: 30.6 PG (ref 27–35)
MCV RBC: 91.7 FL (ref 80–100)
PMV BLD: 9 FL (ref 7.6–11.3)
POTASSIUM SERPL-SCNC: 4.3 MMOL/L (ref 3.5–5.1)
RBC # BLD: 3.88 M/UL (ref 3.86–4.86)

## 2018-09-12 RX ADMIN — METOPROLOL TARTRATE SCH MG: 50 TABLET, FILM COATED ORAL at 05:31

## 2018-09-12 RX ADMIN — Medication SCH: at 08:24

## 2018-09-12 RX ADMIN — MORPHINE SULFATE PRN MG: 4 INJECTION, SOLUTION INTRAMUSCULAR; INTRAVENOUS at 08:23

## 2018-09-12 RX ADMIN — CEFTRIAXONE SCH MLS: 1 INJECTION, SOLUTION INTRAVENOUS at 08:23

## 2018-09-12 RX ADMIN — MORPHINE SULFATE PRN MG: 4 INJECTION, SOLUTION INTRAMUSCULAR; INTRAVENOUS at 03:55

## 2018-09-12 NOTE — RAD REPORT
EXAM DESCRIPTION:

RAD - Urethrocystogrphy Retrograde - 9/11/2018 2:08 pm

 

CLINICAL HISTORY:  Abdominal pain/ureteral stent placement.

 

FINDINGS:  Fluoroscopic spot images are submitted. The exam was performed by Dr. Bhatia.

 

They demonstrate placement of a left ureteral stent. Please refer to Dr. Bhatia's report for additiona
l findings.

 

A total of nine fluoroscopic spot images are submitted. Fluoroscopy time 40 seconds.

## 2018-09-12 NOTE — OP
Surgeon:  Gregorio Bhatia MD



Preoperative Diagnoses:  A 1 to 2 mm stone, left ureterovesical junction, urinary tract infection, ch
ronic pain.



Postoperative Diagnoses:  A 1 to 2 mm stone, left ureterovesical junction, urinary tract infection, c
hronic pain.



Procedure Performed:  Cystoscopy, left retrograde pyelogram, and insertion of double-J stent.



Findings:  No hydronephrosis was found.  Ureter was thin.  I felt this was not hydronephrotic.  Mild 
obstruction was found on the left UVJ possible to stone.  Stent went by easily.  Her bladder was laurie
ened and inflamed like she had a UTI.  Urine culture pending.



Complications:  None.



Drains Placed:  A 6-Slovenian x 30 cm stent with string placed in vagina to facilitate removal.



Indication:  A 32-year-old lady who has been having pain for 2 days now, left flank pain and came in 
with UTI.  It was deemed necessary to go ahead and place a stent before she gets a pyelonephritis.  S
he was given all general information, alternatives, and risks.  She received perioperative antibiotic
, Rocephin.



Description Of Procedure:  We gave her all the general information, alternatives, and risks.  She was
 taken to the operative suite, placed in a supine lithotomy position.  After general anesthesia was a
dministered, the area was prepped and draped.  Obturator was placed per urethra in the bladder with a
 30-degree lens finding a red and inflamed bladder mucosa all over the bladder, right, left dome, and
 trigone.  Bladder neck and urethra were normal.  We then did a left retrograde pyelogram to delineat
e the ureter up to the renal pelvis taking care not to use too much pressure to avoid seeding of bact
eria.  We then placed a guidewire coil, measured the ureter, measured about 30+ cm, the longest that 
we have is a 30 cm stent, this was placed.  It was a nice tight coil in the renal pelvis and bladder.
  String left attached coming out through the vagina to facilitate removal.  We should hopefully leav
e the stent in for a week to dilate the ureter to help her pass the 1 mm stone.





SUMEET/JAY

DD:  09/11/2018 14:07:02Voice ID:  [id number]

DT:  09/12/2018 00:45:28Report ID:  [id number]

## 2018-09-13 NOTE — DS
Date of Discharge:  09/12/2018



Consultants:  Dr. Bhatia with Urology.



Procedures:  Cystoscopy, left retrograde pyelogram, insertion of double-J stent.



Admitting Diagnoses:  

1.Nephrolithiasis with left-sided hydronephrosis.

2.Urinary tract infection with possible infected kidney stone.

3.Morbid obesity.



Discharge Diagnoses:  

1.Nephrolithiasis with left-sided hydronephrosis status post cystoscopy and double-J stent.

2.Urinary tract infection with infected kidney stone.  Urine culture growing Escherichia coli pansen
sitive except for ampicillin and cephalothin.

3.Morbid obesity, BMI 47.



Hospital Course:  The patient is a 32-year-old female who came into the hospital with left-sided flan
k pain, found to have a kidney stone on CT scan, 1 mm calculus, resulting in mild left hydronephrosis
.  The patient was seen by Dr. Bhatia with Urology, who performed the above-named procedure.  The yaneli
ent did have improvement in her condition.  Pain improved.  She was continued on IV fluids for hydrat
ion as well as on IV antibiotics for hydronephrosis and infected kidney stone.  The patient did well,
 did have some hematuria postprocedure.  The patient was then cleared for discharge from Dr. Bhatia's 
standpoint.  The patient was sent home in a stable condition.



Activity:  As tolerated.



Medications:  As per medication reconciliation list.



Followup:  Follow up with primary care physician in 2-3 days.  Follow up with urologist, Dr. Bhatia in
 2 weeks.  Return to ER for worsening condition.  Diet, calorie restricted.  The patient counseled on
 diet and exercise regimen.



Physical Examination:

General:  Awake, alert, oriented x3.  No acute distress. 

CV:  S1, S2.  No murmurs. 

Respiratory:  Moving air well bilaterally. 

Abdomen:  Soft, nontender, nondistended.  Positive bowel sounds. 

Extremities:  No clubbing, cyanosis, edema. 

Neurologic:  Nonfocal. 



Total time spent discharging the patient was 35 minutes.





/JAY

DD:  09/12/2018 16:28:26Voice ID:  [id number]

DT:  09/13/2018 15:31:23Report ID:  [id number]

## 2018-09-18 ENCOUNTER — HOSPITAL ENCOUNTER (EMERGENCY)
Dept: HOSPITAL 97 - ER | Age: 33
Discharge: HOME | End: 2018-09-18
Payer: SELF-PAY

## 2018-09-18 VITALS — OXYGEN SATURATION: 96 % | SYSTOLIC BLOOD PRESSURE: 143 MMHG | DIASTOLIC BLOOD PRESSURE: 75 MMHG

## 2018-09-18 VITALS — TEMPERATURE: 98.2 F

## 2018-09-18 DIAGNOSIS — Z87.442: ICD-10-CM

## 2018-09-18 DIAGNOSIS — F17.210: ICD-10-CM

## 2018-09-18 DIAGNOSIS — N20.9: Primary | ICD-10-CM

## 2018-09-18 LAB
ALBUMIN SERPL BCP-MCNC: 3.5 G/DL (ref 3.4–5)
ALP SERPL-CCNC: 89 U/L (ref 45–117)
ALT SERPL W P-5'-P-CCNC: 54 U/L (ref 12–78)
AMYLASE SERPL-CCNC: 44 U/L (ref 25–115)
AST SERPL W P-5'-P-CCNC: 23 U/L (ref 15–37)
BUN BLD-MCNC: 13 MG/DL (ref 7–18)
CAOX CRY URNS QL MICRO: (no result)
GLUCOSE SERPLBLD-MCNC: 106 MG/DL (ref 74–106)
HCT VFR BLD CALC: 42.2 % (ref 36–45)
LIPASE SERPL-CCNC: 131 U/L (ref 73–393)
LYMPHOCYTES # SPEC AUTO: 3.1 K/UL (ref 0.7–4.9)
MCH RBC QN AUTO: 30.3 PG (ref 27–35)
MCV RBC: 89.3 FL (ref 80–100)
PMV BLD: 8.8 FL (ref 7.6–11.3)
POTASSIUM SERPL-SCNC: 3.9 MMOL/L (ref 3.5–5.1)
RBC # BLD: 4.73 M/UL (ref 3.86–4.86)
UA COMPLETE W REFLEX CULTURE PNL UR: (no result)

## 2018-09-18 PROCEDURE — 87088 URINE BACTERIA CULTURE: CPT

## 2018-09-18 PROCEDURE — 80048 BASIC METABOLIC PNL TOTAL CA: CPT

## 2018-09-18 PROCEDURE — 87086 URINE CULTURE/COLONY COUNT: CPT

## 2018-09-18 PROCEDURE — 96375 TX/PRO/DX INJ NEW DRUG ADDON: CPT

## 2018-09-18 PROCEDURE — 85025 COMPLETE CBC W/AUTO DIFF WBC: CPT

## 2018-09-18 PROCEDURE — 82150 ASSAY OF AMYLASE: CPT

## 2018-09-18 PROCEDURE — 99284 EMERGENCY DEPT VISIT MOD MDM: CPT

## 2018-09-18 PROCEDURE — 83690 ASSAY OF LIPASE: CPT

## 2018-09-18 PROCEDURE — 81025 URINE PREGNANCY TEST: CPT

## 2018-09-18 PROCEDURE — 80076 HEPATIC FUNCTION PANEL: CPT

## 2018-09-18 PROCEDURE — 36415 COLL VENOUS BLD VENIPUNCTURE: CPT

## 2018-09-18 PROCEDURE — 96374 THER/PROPH/DIAG INJ IV PUSH: CPT

## 2018-09-18 PROCEDURE — 81001 URINALYSIS AUTO W/SCOPE: CPT

## 2018-09-18 NOTE — ER
Nurse's Notes                                                                                     

 [facility]                                                                

Name: Marlen Shelby                                                                              

Age: 32 yrs                                                                                       

Sex: Female                                                                                       

: 1985                                                                                   

MRN: [id number]                                                                                   

Arrival Date: 2018                                                                          

Time: 03:39                                                                                       

Account#: [id number]                                                                            

Bed 26                                                                                            

Private MD:                                                                                       

Diagnosis: Hematuria, unspecified;Calculus of lower urinary tract, unspecified                    

                                                                                                  

Presentation:                                                                                     

                                                                                             

03:58 Presenting complaint: Patient states: Came to ER last Tuesday and diagnosed with kidney lp1 

      stone in ureter, stent placed on Wednesday by Dr. Bhatia; Patient has been having            

      increased blood in urine and left flank pain since procedure. Transition of care:           

      patient was not received from another setting of care. Onset of symptoms was 2018. Risk Assessment: Do you want to hurt yourself or someone else? Patient            

      reports no desire to harm self or others. Initial Sepsis Screen: Does the patient meet      

      any 2 criteria? No. Patient's initial sepsis screen is negative. Does the patient have      

      a suspected source of infection? No. Patient's initial sepsis screen is negative. Care      

      prior to arrival: None.                                                                     

03:58 Method Of Arrival: Wheelchair                                                           lp1 

03:58 Acuity: MATY 3                                                                           lp1 

                                                                                                  

OB/GYN:                                                                                           

03:59 LMP 2018                                                                           lp1 

                                                                                                  

Historical:                                                                                       

- Allergies:                                                                                      

04:01 No Known Allergies;                                                                     lp1 

- Home Meds:                                                                                      

04:01 Bactrim -160 mg Oral tab 1 tab 2 times per day [Active];                          lp1 

- PMHx:                                                                                           

04:01 Kidney stones;                                                                          lp1 

- PSHx:                                                                                           

04:01 Kidney stents; Tubal ligation;                                                          lp1 

                                                                                                  

- Immunization history:: Adult Immunizations up to date.                                          

- Social history:: Smoking status: Patient uses tobacco products, smokes one-half pack            

  cigarettes per day.                                                                             

- Ebola Screening: : No symptoms or risks identified at this time.                                

                                                                                                  

                                                                                                  

Screenin:07 Abuse screen: Denies threats or abuse. Denies injuries from another. Nutritional        lp1 

      screening: No deficits noted. Tuberculosis screening: No symptoms or risk factors           

      identified. Fall Risk None identified.                                                      

                                                                                                  

Assessment:                                                                                       

04:05 General: Appears uncomfortable, Behavior is crying. Pain: Complains of pain in left     lp1 

      lower quadrant Pain radiates to left low back Pain currently is 10 out of 10 on a pain      

      scale. Quality of pain is described as sharp, stabbing, Pain began gradually. Neuro:        

      Level of Consciousness is awake, alert, obeys commands. Cardiovascular: Patient's skin      

      is warm and dry. Respiratory: Respiratory effort is even, unlabored. GI: Abdomen is         

      obese, Reports nausea. : Urine is ruma blood, Reports burning with urination,            

      cramping, in left flank(s) lower quadrant(s) pain in left flank(s). EENT: No signs          

      and/or symptoms were reported regarding the EENT system. Derm: Skin is intact, Skin is      

      diaphoretic, Skin is normal. Musculoskeletal: Circulation, motion, and sensation intact.    

05:30 Reassessment: Patient states continued pain to left flank; Provider notified.           lp1 

                                                                                                  

Vital Signs:                                                                                      

03:59  / 113; Pulse 85; Resp 18; Temp 98.2(O); Pulse Ox 98% on R/A; Weight 141.52 kg;   lp1 

      Height 5 ft. 7 in. (170.18 cm); Pain 10/10;                                                 

04:04  / 106; Pulse 67; Resp 18; Pulse Ox 97% on R/A;                                   lp1 

05:30  / 86; Pulse 59; Resp 18; Pulse Ox 97% on R/A;                                    lp1 

06:15  / 75; Pulse 66; Resp 18; Pulse Ox 96% on R/A;                                    lp1 

03:59 Body Mass Index 48.87 (141.52 kg, 170.18 cm)                                            lp1 

                                                                                                  

ED Course:                                                                                        

03:39 Patient arrived in ED.                                                                  es  

03:41 Kimberli Serna, RN is Primary Nurse.                                                       lp1 

03:49 Asher Bhatia MD is Attending Physician.                                            tw4 

03:59 Triage completed.                                                                       lp1 

04:01 Inserted saline lock: 20 gauge in right antecubital area, using aseptic technique.      lp1 

      Blood collected. By jayla Mccoy.                                                        

04:07 Patient has correct armband on for positive identification. Placed in gown. Bed in low  lp1 

      position. Call light in reach. Pulse ox on. NIBP on.                                        

04:07 Arm band placed on.                                                                     lp1 

06:48 Gregorio Bhatia MD is Referral Physician.                                              tw4 

06:54 No provider procedures requiring assistance completed.                                  lp1 

07:09 IV discontinued, No redness/swelling at site. Pressure dressing applied.                lp1 

                                                                                                  

Administered Medications:                                                                         

04:05 Drug: morphine 4 mg Route: IVP; Site: right antecubital;                                lp1 

05:15 Follow up: Response: Pain is decreased                                                  lp1 

04:05 Drug: Zofran 4 mg Route: IVP; Site: right antecubital;                                  lp1 

05:15 Follow up: Response: Nausea is decreased                                                lp1 

05:50 Drug: TORadol 30 mg Route: IVP; Site: right antecubital;                                lp1 

07:10 Follow up: Response: Pain is decreased                                                  lp1 

                                                                                                  

                                                                                                  

Outcome:                                                                                          

06:47 Discharge ordered by MD.                                                                tw4 

07:09 Discharged to home ambulatory, with family.                                             lp1 

07:09 Condition: good                                                                             

07:09 Discharge instructions given to patient, Instructed on discharge instructions, follow       

      up and referral plans. medication usage, Demonstrated understanding of instructions,        

      follow-up care, medications, Prescriptions given X 2.                                       

07:10 Patient left the ED.                                                                    lp1 

                                                                                                  

Signatures:                                                                                       

Pilar Moreland Laura RN                         RN   lp1                                                  

Asher Bhatia MD MD   tw4                                                  

                                                                                                  

**************************************************************************************************

## 2018-09-18 NOTE — EDPHYS
Physician Documentation                                                                           

 [facility]                                                                

Name: Marlen Shelby                                                                              

Age: 32 yrs                                                                                       

Sex: Female                                                                                       

: 1985                                                                                   

MRN: [id number]                                                                                   

Arrival Date: 2018                                                                          

Time: 03:39                                                                                       

Account#: [id number]                                                                            

Bed 26                                                                                            

Private MD:                                                                                       

ED Physician Asher Bhatia                                                                     

HPI:                                                                                              

                                                                                             

04:49 This 32 yrs old  Female presents to ER via Wheelchair with complaints of Flank  tw4 

      Pain, Nausea.                                                                               

04:49 The patient complains of pain in the left mid back. The pain radiates to the left       tw4 

      femoral area and left inguinal area. Onset: The symptoms/episode began/occurred today.      

      Modifying factors: The symptoms are alleviated by nothing. the symptoms are aggravated      

      by nothing. Associated signs and symptoms: The patient has no apparent associated signs     

      or symptoms. Severity of pain: At its worst the pain was moderate in the emergency          

      department the pain is unchanged. The patient has not experienced similar symptoms in       

      the past.                                                                                   

                                                                                                  

OB/GYN:                                                                                           

03:59 LMP 2018                                                                           lp1 

                                                                                                  

Historical:                                                                                       

- Allergies:                                                                                      

04:01 No Known Allergies;                                                                     lp1 

- Home Meds:                                                                                      

04:01 Bactrim -160 mg Oral tab 1 tab 2 times per day [Active];                          lp1 

- PMHx:                                                                                           

04:01 Kidney stones;                                                                          lp1 

- PSHx:                                                                                           

04:01 Kidney stents; Tubal ligation;                                                          lp1 

                                                                                                  

- Immunization history:: Adult Immunizations up to date.                                          

- Social history:: Smoking status: Patient uses tobacco products, smokes one-half pack            

  cigarettes per day.                                                                             

- Ebola Screening: : No symptoms or risks identified at this time.                                

                                                                                                  

                                                                                                  

ROS:                                                                                              

04:49 Constitutional: Negative for fever, chills, and weight loss, Cardiovascular: Negative   tw4 

      for chest pain, palpitations, and edema, Respiratory: Negative for shortness of breath,     

      cough, wheezing, and pleuritic chest pain, Abdomen/GI: Negative for abdominal pain,         

      nausea, vomiting, diarrhea, and constipation.                                               

04:49 MS/Extremity: Negative for injury and deformity, Skin: Negative for injury, rash, and       

      discoloration, Neuro: Negative for headache, weakness, numbness, tingling, and seizure.     

04:49 Back: Positive for flank pain, on the left.                                                 

                                                                                                  

Exam:                                                                                             

04:49 Constitutional:  This is a well developed, well nourished patient who is awake, alert,  tw4 

      and in no acute distress. Head/Face:  Normocephalic, atraumatic. Chest/axilla:  Normal      

      chest wall appearance and motion.  Nontender with no deformity.  No lesions are             

      appreciated. Cardiovascular:  Regular rate and rhythm with a normal S1 and S2.  No          

      gallops, murmurs, or rubs.  Normal PMI, no JVD.  No pulse deficits. Respiratory:  Lungs     

      have equal breath sounds bilaterally, clear to auscultation and percussion.  No rales,      

      rhonchi or wheezes noted.  No increased work of breathing, no retractions or nasal          

      flaring. Abdomen/GI:  Soft, non-tender, with normal bowel sounds.  No distension or         

      tympany.  No guarding or rebound.  No evidence of tenderness throughout.                    

04:49 MS/ Extremity:  Pulses equal, no cyanosis.  Neurovascular intact.  Full, normal range       

      of motion. Neuro:  Awake and alert, GCS 15, oriented to person, place, time, and            

      situation.  Cranial nerves II-XII grossly intact.  Motor strength 5/5 in all                

      extremities.  Sensory grossly intact.  Cerebellar exam normal.  Normal gait.                

04:49 Back: pain, is absent, ROM is normal, normal spinal alignment noted, CVA tenderness, is     

      noted on the left.                                                                          

                                                                                                  

Vital Signs:                                                                                      

03:59  / 113; Pulse 85; Resp 18; Temp 98.2(O); Pulse Ox 98% on R/A; Weight 141.52 kg;   lp1 

      Height 5 ft. 7 in. (170.18 cm); Pain 10/10;                                                 

04:04  / 106; Pulse 67; Resp 18; Pulse Ox 97% on R/A;                                   lp1 

05:30  / 86; Pulse 59; Resp 18; Pulse Ox 97% on R/A;                                    lp1 

06:15  / 75; Pulse 66; Resp 18; Pulse Ox 96% on R/A;                                    lp1 

03:59 Body Mass Index 48.87 (141.52 kg, 170.18 cm)                                            lp1 

                                                                                                  

MDM:                                                                                              

03:49 Patient medically screened.                                                             tw4 

04:49 Differential diagnosis: nephrolithiasis, pyelonephritis. Data reviewed: vital signs,    tw4 

      nurses notes.                                                                               

                                                                                                  

                                                                                             

03:52 Order name: Amylase, Serum; Complete Time: 06:11                                        tw4 

                                                                                             

03:52 Order name: Basic Metabolic Panel; Complete Time: 06:11                                 tw4 

                                                                                             

06:11 Interpretation: Normal except: ; GFR 63.                                          [facility] 

                                                                                             

03:52 Order name: CBC with Diff; Complete Time: 06:11                                         [facility] 

                                                                                             

06:11 Interpretation: WBC 12.2; RBC 4.73; HCT 42.2; ; EOSINOPHIL % 4.7.                [facility] 

                                                                                             

03:52 Order name: Creatinine for Radiology; Complete Time: 06:11                              [facility] 

                                                                                             

03:52 Order name: Hepatic Function; Complete Time: 06:11                                      [facility] 

                                                                                             

06:11 Interpretation: TP 8.4; GLOB 4.9; A/G 0.7.                                              [facility] 

                                                                                             

03:52 Order name: Lipase; Complete Time: 06:11                                                [facility] 

                                                                                             

04:23 Order name: Pregnancy Test, Urine; Complete Time: 06:11                                 [facility]

                                                                                             

04:23 Order name: Urinalysis W/Microscopic; Complete Time: 06:11                              [facility]

                                                                                             

05:04 Order name: Urine Culture                                                               [facility]

                                                                                             

03:52 Order name: Urine Pregnancy Test (obtain specimen); Complete Time: 05:05                [facility] 

                                                                                             

03:52 Order name: IV Saline Lock; Complete Time: 04:05                                        [facility] 

                                                                                             

03:52 Order name: Labs collected and sent; Complete Time: 04:05                               [facility] 

                                                                                             

03:52 Order name: Urine Dipstick-Ancillary (obtain specimen); Complete Time: 04:05             

                                                                                                  

Administered Medications:                                                                         

04:05 Drug: morphine 4 mg Route: IVP; Site: right antecubital;                                lp1 

05:15 Follow up: Response: Pain is decreased                                                  lp1 

04:05 Drug: Zofran 4 mg Route: IVP; Site: right antecubital;                                  lp1 

05:15 Follow up: Response: Nausea is decreased                                                lp1 

05:50 Drug: TORadol 30 mg Route: IVP; Site: right antecubital;                                lp1 

07:10 Follow up: Response: Pain is decreased                                                  lp1 

                                                                                                  

                                                                                                  

Disposition:                                                                                      

18 06:47 Discharged to Home. Impression: Hematuria, unspecified, Calculus of lower          

  urinary tract, unspecified.                                                                     

- Condition is Stable.                                                                            

- Discharge Instructions: Hematuria, Adult, Kidney Stones, Easy-to-Read.                          

- Prescriptions for Ibuprofen 800 mg Oral Tablet - take 1 tablet by ORAL route every 8            

  hours As needed take with food; 30 tablet. Tylenol- Codeine #3 300-30 mg Oral Tablet            

  - take 2 tablet by ORAL route every 6 hours As needed; 6 tablet.                                

- Medication Reconciliation Form, Thank You Letter, Antibiotic Education, Prescription            

  Opioid Use form.                                                                                

- Follow up: Private Physician; When: Upon discharge from the Emergency Department;               

  Reason: Recheck today's complaints, Continuance of care, Re-evaluation by your                  

  physician. Follow up: Gregorio Bhatia MD; When: Upon discharge from the Emergency              

  Department; Reason: Recheck today's complaints, Continuance of care, Re-evaluation by           

  your physician.                                                                                 

- Problem is new.                                                                                 

- Symptoms have improved.                                                                         

                                                                                                  

                                                                                                  

                                                                                                  

Signatures:                                                                                       

Dispatcher [employer]                           [facility]                                                 

Kimberli Serna RN                         RN   lp1                                                  

Asher Bhatia MD MD   tw4                                                  

                                                                                                  

Corrections: (The following items were deleted from the chart)                                    

04:23 03:52 UA MICROSCOPIC+U.LAB.BRZ ordered. [facility]

04:23 03:57 URINALYSIS+U.LAB.BRZ ordered. [facility]

06:48 06:47 2018 06:47 Discharged to Home. Impression: Hematuria, unspecified; Calculus tw4 

      of lower urinary tract, unspecified. Condition is Stable. Forms are Medication              

      Reconciliation Form, Thank You Letter, Antibiotic Education, Prescription Opioid Use.       

      Follow up: Private Physician; When: Upon discharge from the Emergency Department;           

      Reason: Recheck today's complaints, Continuance of care, Re-evaluation by your              

      physician. Problem is new. Symptoms have improved. tw4                                      

07:10 06:48 2018 06:47 Discharged to Home. Impression: Hematuria, unspecified; Calculus lp1 

      of lower urinary tract, unspecified. Condition is Stable. Discharge Instructions:           

      Hematuria, Adult, Kidney Stones, Easy-to-Read. Prescriptions for Ibuprofen 800 mg Oral      

      Tablet - take 1 tablet by ORAL route every 8 hours As needed take with food; 30 tablet,     

      Tylenol-Codeine #3 300-30 mg Oral Tablet - take 2 tablet by ORAL route every 6 hours As     

      needed; 6 tablet. and Forms are Medication Reconciliation Form, Thank You Letter,           

      Antibiotic Education, Prescription Opioid Use. Follow up: Private Physician; When: Upon     

      discharge from the Emergency Department; Reason: Recheck today's complaints,                

      Continuance of care, Re-evaluation by your physician. Follow up: Gregorio Bhatia; When:      

      Upon discharge from the Emergency Department; Reason: Recheck today's complaints,           

      Continuance of care, Re-evaluation by your physician. Problem is new. Symptoms have         

      improved. tw4                                                                               

                                                                                                  

**************************************************************************************************